# Patient Record
(demographics unavailable — no encounter records)

---

## 2025-02-13 NOTE — PHYSICAL EXAM
[Normal] : normal gait, coordination grossly intact, no focal deficits and deep tendon reflexes were 2+ and symmetric [de-identified] : non-palpable liver edge [de-identified] : mild tenderness to palpation of right rib area, no rebound or guarding

## 2025-02-13 NOTE — HISTORY OF PRESENT ILLNESS
[de-identified] : 55-year-old female presenting for annual health maintenance and physical examination. She has made several changes in her lifestyle including dietary habits and increased physical activity. She also has concerns with an elevated LDL cholesterol level and a sore spot on her rib cage. Last year, her LDL was 118, increased from 113. This year it is 140 and total cholesterol is 202. She reports a change in diet, no longer on the low-inflammation [place] diet, but continues daily intermittent fasting. The patient has lost weight over the last year, 10 pounds since her peak many years ago, and has made several lifestyle changes including working out more and choosing better foods. She has been consistent with maintaining a healthy weight. She is currently on levothyroxine and occasionally takes Xanax for sleep issues. She no longer takes duloxetine and never took nifedipine. Her father had high cholesterol and her oldest sister is currently on blood pressure medication. She is a  with kids who are currently in college. She describes sometimes having a pizza party once a week in her class.

## 2025-03-21 NOTE — ASSESSMENT
[FreeTextEntry1] : Colon cancer screening Average risk Prior colonoscopy: 2019; normal, report reviewed  Alarm symptoms: none  Plan: Patient scheduled for colonoscopy Preparation (suprep) sent to pharmacy and explained to patient All questions answered  Follow up at scheduled endoscopic procedure

## 2025-03-21 NOTE — HISTORY OF PRESENT ILLNESS
[de-identified] : none  [FreeTextEntry1] : 2019: normal colonoscopy, interval follow up in 5 years

## 2025-04-01 NOTE — HISTORY OF PRESENT ILLNESS
[FreeTextEntry1] : 55  presents today for well woman exam. Post menopausal spotting--- DESMOND Sun  LMP:   POB: cs PGYN:nl pap--PMB PMH: hypothyroid, valtrex PSH: CS Med: per mar All: [facility] SH: shahzad FH: mana  Mammo: 2024  admission

## 2025-05-22 NOTE — PROCEDURE
[Endometrial Biopsy] : Endometrial biopsy [Time out performed] : Pre-procedure time out performed.  Patient's name, date of birth and procedure confirmed. [Consent Obtained] : Consent obtained [Risks] : risks [Benefits] : benefits [Alternatives] : alternatives [Patient] : patient [Infection] : infection [Bleeding] : bleeding [Allergic Reaction] : allergic reaction [Uterine Perforation] : uterine perforation [Pain] : pain [Negative] : negative pregnancy test [Betadine] : Betadine [None] : none [___ mL Injected] : [unfilled] ~UmL of lidocaine [Tenaculum] : Tenaculum [Anteverted] : anteverted [Scant] : scant [Specimen Collected] : collected [Sent to Pathology] : placed in buffered formalin and sent for pathology [Tolerated Well] : Patient tolerated the procedure well [No Complications] : No complications

## 2025-05-22 NOTE — PHYSICAL EXAM
[MA] : MA [FreeTextEntry2] : Kristy MARTINEZ [Appropriately responsive] : appropriately responsive [Oriented x3] : oriented x3 [Labia Majora] : normal [Labia Minora] : normal [Normal] : normal [Uterine Adnexae] : normal